# Patient Record
Sex: FEMALE | Race: WHITE | HISPANIC OR LATINO | URBAN - METROPOLITAN AREA
[De-identification: names, ages, dates, MRNs, and addresses within clinical notes are randomized per-mention and may not be internally consistent; named-entity substitution may affect disease eponyms.]

---

## 2018-02-02 ENCOUNTER — EMERGENCY (EMERGENCY)
Facility: HOSPITAL | Age: 78
LOS: 1 days | Discharge: HOME | End: 2018-02-02
Admitting: EMERGENCY MEDICINE
Payer: SELF-PAY

## 2018-02-02 VITALS
HEART RATE: 89 BPM | DIASTOLIC BLOOD PRESSURE: 65 MMHG | RESPIRATION RATE: 17 BRPM | SYSTOLIC BLOOD PRESSURE: 130 MMHG | WEIGHT: 158.07 LBS | OXYGEN SATURATION: 99 % | TEMPERATURE: 98 F

## 2018-02-02 DIAGNOSIS — S66.912A STRAIN OF UNSPECIFIED MUSCLE, FASCIA AND TENDON AT WRIST AND HAND LEVEL, LEFT HAND, INITIAL ENCOUNTER: ICD-10-CM
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
## 2018-02-02 DIAGNOSIS — M25.571 PAIN IN RIGHT ANKLE AND JOINTS OF RIGHT FOOT: ICD-10-CM

## 2018-02-02 DIAGNOSIS — S80.02XA CONTUSION OF LEFT KNEE, INITIAL ENCOUNTER: ICD-10-CM
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
## 2018-02-02 PROCEDURE — 73562 X-RAY EXAM OF KNEE 3: CPT

## 2018-02-02 PROCEDURE — 73110 X-RAY EXAM OF WRIST: CPT | Mod: 26,LT

## 2018-02-02 PROCEDURE — 73562 X-RAY EXAM OF KNEE 3: CPT | Mod: 26,LT

## 2018-02-02 PROCEDURE — 73120 X-RAY EXAM OF HAND: CPT

## 2018-02-02 PROCEDURE — 73110 X-RAY EXAM OF WRIST: CPT

## 2018-02-02 PROCEDURE — 73610 X-RAY EXAM OF ANKLE: CPT

## 2018-02-02 PROCEDURE — 73120 X-RAY EXAM OF HAND: CPT | Mod: 26,LT

## 2018-02-02 PROCEDURE — 73610 X-RAY EXAM OF ANKLE: CPT | Mod: 26,RT

## 2018-02-02 PROCEDURE — 99284 EMERGENCY DEPT VISIT MOD MDM: CPT

## 2018-02-02 PROCEDURE — 99284 EMERGENCY DEPT VISIT MOD MDM: CPT | Mod: 25

## 2018-02-02 RX ORDER — ACETAMINOPHEN 500 MG
650 TABLET ORAL ONCE
Qty: 0 | Refills: 0 | Status: COMPLETED | OUTPATIENT
Start: 2018-02-02 | End: 2018-02-02

## 2018-02-02 RX ADMIN — Medication 650 MILLIGRAM(S): at 13:54

## 2018-02-02 NOTE — ED PROVIDER NOTE - OBJECTIVE STATEMENT
77F with pmh of depression on zoloft    no AC use    here with mechanical fall that she sustained yesterday x 2 episodes. First while trying to catch her grand daughter who ran in front of her on a busy street in front of 77F with pmh of depression on zoloft    no AC use    here with mechanical fall that she sustained yesterday x 2 episodes. First while trying to catch her grand daughter who ran in front of her on a busy street in front of the [facility], landed on bilateral hands and knees, able to walk away and continued her visit to the [facility]. En route going home, tripped on a rock while going [place] the subway steps and had a repeated fall onto wrists and knees with a twisting motion of R ankle so had pain and swelling over R ankle. Overnight she iced R ankle with sig relief and now only has pain over R ankle, L knee with notable ecchymosis, and L wrist and L fifth MCP region thus her visit into the ED.     Denies f/c, cp, sob, power, headache, visual issues, abd pain, n/v/c/d, head trauma, ac use, loc/syncope, urinary complaints, uri sxs.

## 2018-02-02 NOTE — ED ADULT NURSE NOTE - CHIEF COMPLAINT QUOTE
left wrist pain, right ankle pain s/p trip and fall x 2 today.  1st fall while crossing [address] and patient trip and fell forward.  2nd fall [place] subway [place] - pt fell forward after tripping over step.  Denies loc, head trauma, neck / back pain, use of thinners, dizziness.  Patient ambulated w/ steady gait.

## 2018-02-02 NOTE — ED PROVIDER NOTE - MEDICAL DECISION MAKING DETAILS
Elderly female with mechanical fall x 2 yesterday but able to ambulate after now with post fall pain. Plan for r/o fracture with XRs over L knee/wrist/hand, and R ankle. If no fracture anticipate discharge with RICE and PMD f/u.

## 2018-02-02 NOTE — ED PROVIDER NOTE - MUSCULOSKELETAL, MLM
+ecchymosis over L anteromedial aspect of knee, ROM intact over all extremities. +TTP over L fifth MCP but no snuffbox tenderness. Pulses, sensation and motor function completely intact. Mild swelling and ttp over R lateral mall anteriorly but with FROM and able to bear weight, pulses and sensation intact, ROM intact. No open wounds.

## 2018-02-02 NOTE — ED PROVIDER NOTE - PROGRESS NOTE DETAILS
No fracture seen on XR. Noted strain over L wrist. Will provide thumb spica. Patient stable for discharge, to go home with granddaughter, advised to use OTC APAP or ibuprofen for pain, return precautions discussed, advised to f/u with PMD.

## 2021-09-07 NOTE — ED ADULT TRIAGE NOTE - CHIEF COMPLAINT QUOTE
left wrist pain, right ankle pain s/p trip and fall x 2 today.  1st fall while crossing [address] and patient trip and fell forward.  2nd [place] subway [place] - pt fell forward after tripping over step.  Denies loc, head trauma, neck / back pain, use of thinners, dizziness.  Patient ambulated w/ steady gait. fair balance

## 2025-06-17 NOTE — ED PROVIDER NOTE - ENMT, MLM
67
Airway patent, Nasal mucosa clear. Mouth with normal mucosa. Throat has no vesicles, no oropharyngeal exudates and uvula is midline.